# Patient Record
Sex: MALE | Race: WHITE
[De-identification: names, ages, dates, MRNs, and addresses within clinical notes are randomized per-mention and may not be internally consistent; named-entity substitution may affect disease eponyms.]

---

## 2022-05-17 ENCOUNTER — APPOINTMENT (OUTPATIENT)
Dept: ORTHOPEDIC SURGERY | Facility: CLINIC | Age: 67
End: 2022-05-17
Payer: OTHER MISCELLANEOUS

## 2022-05-17 VITALS — BODY MASS INDEX: 30.53 KG/M2 | WEIGHT: 190 LBS | HEIGHT: 66 IN

## 2022-05-17 DIAGNOSIS — Z78.9 OTHER SPECIFIED HEALTH STATUS: ICD-10-CM

## 2022-05-17 PROBLEM — Z00.00 ENCOUNTER FOR PREVENTIVE HEALTH EXAMINATION: Status: ACTIVE | Noted: 2022-05-17

## 2022-05-17 PROCEDURE — 99213 OFFICE O/P EST LOW 20 MIN: CPT

## 2022-05-17 PROCEDURE — 99072 ADDL SUPL MATRL&STAF TM PHE: CPT

## 2022-05-17 NOTE — WORK
[Partial] : partial [Reveals pre-existing condition(s) that may affect treatment/prognosis] : reveals pre-existing condition(s) that may affect treatment/prognosis [Can return to work without limitations on ______] : can return to work without limitations on [unfilled] [N/A] : : Not Applicable [Patient] : patient [No] : No [No Rx restrictions] : No Rx restrictions. [I provided the services listed above] :  I provided the services listed above. [FreeTextEntry1] : guarded [FreeTextEntry2] : OA knee, OA thumb [Medium Work:] : Medium Work: Exerting 20 to 50 pounds of force occasionally, and/or 10 to 25 pounds of force frequently, and/or greater than negligible up to 10 pounds of force constantly to move objects. Physical demand requirements are in excess of those for Light Work

## 2022-05-17 NOTE — PHYSICAL EXAM
[1st] : 1st [CMC Joint] : CMC joint [Left] : left knee [5___] : hamstring 5[unfilled]/5 [Equivocal] : equivocal [place] [] : no effusion [TWNoteComboBox7] : flexion 120 degrees [de-identified] : extension 0 degrees

## 2022-05-17 NOTE — HISTORY OF PRESENT ILLNESS
[5] : 5 [Dull/Aching] : dull/aching [Intermittent] : intermittent [Nothing helps with pain getting better] : Nothing helps with pain getting better [Walking] : walking [Stairs] : stairs [de-identified] : 05/17/2022: \par 2/15/22- Slight improvement only with viscoinjection series\par 1/18/22- For Orthovisc #4 left knee\par 1/11/22- For Orthovisc #3 left knee\par 1/5/22- For Orthovisc #2 left knee\par 12/28/21- For Orthovisc #1 left knee\par 7/27/21- Still having left knee issues, wants to repeat injections\par 10/29/20- Still having sharp pains left thumb when he exerts with it, left knee sore/stiff\par History of Present Illness\par 7/21/20- Had mild positive response to viscoinjections, left thumb still sore when forced\par 5/8/20- Left thumb, right shoulder, right knee improving, left knee still quite painful piero with stair climbing\par 4/28/20- f/u left hand, right shoulder, knees, overall slowly improving, left thumb still sore when hyperextended, right\par shoulder sore with certain motions, knees sore on stairs\par 3/11/20- Had a trip and fall down last step at work last week, smashed knees to ground, left hand, left side of face and\par right shoulder into concrete wall and railing. Had mouth stitched up in the hospital ER. Has soreness right shoulder with\par certain motions, soreness left hand with use of thumb, and both knees feel tight [] : no [FreeTextEntry1] : Left knee [FreeTextEntry5] : Had a trip and fall down last step at work last week, smashed knees to ground, left hand, left side of face and\par right shoulder into concrete wall and railing. Had mouth stitched up in the hospital ER.

## 2022-07-18 ENCOUNTER — APPOINTMENT (OUTPATIENT)
Dept: ORTHOPEDIC SURGERY | Facility: CLINIC | Age: 67
End: 2022-07-18

## 2022-08-16 ENCOUNTER — APPOINTMENT (OUTPATIENT)
Dept: ORTHOPEDIC SURGERY | Facility: CLINIC | Age: 67
End: 2022-08-16

## 2022-08-16 VITALS — WEIGHT: 190 LBS | HEIGHT: 66 IN | BODY MASS INDEX: 30.53 KG/M2

## 2022-08-16 DIAGNOSIS — S63.682D: ICD-10-CM

## 2022-08-16 DIAGNOSIS — M17.12 UNILATERAL PRIMARY OSTEOARTHRITIS, LEFT KNEE: ICD-10-CM

## 2022-08-16 DIAGNOSIS — M18.12 UNILATERAL PRIMARY OSTEOARTHRITIS OF FIRST CARPOMETACARPAL JOINT, LEFT HAND: ICD-10-CM

## 2022-08-16 PROCEDURE — 99214 OFFICE O/P EST MOD 30 MIN: CPT

## 2022-08-16 PROCEDURE — 99072 ADDL SUPL MATRL&STAF TM PHE: CPT

## 2022-08-16 NOTE — HISTORY OF PRESENT ILLNESS
[Dull/Aching] : dull/aching [Tingling] : tingling [5] : 5 [Intermittent] : intermittent [Nothing helps with pain getting better] : Nothing helps with pain getting better [Walking] : walking [Stairs] : stairs [de-identified] : 05/17/2022: \par 2/15/22- Slight improvement only with viscoinjection series\par 1/18/22- For Orthovisc #4 left knee\par 1/11/22- For Orthovisc #3 left knee\par 1/5/22- For Orthovisc #2 left knee\par 12/28/21- For Orthovisc #1 left knee\par 7/27/21- Still having left knee issues, wants to repeat injections\par 10/29/20- Still having sharp pains left thumb when he exerts with it, left knee sore/stiff\par History of Present Illness\par 7/21/20- Had mild positive response to viscoinjections, left thumb still sore when forced\par 5/8/20- Left thumb, right shoulder, right knee improving, left knee still quite painful piero with stair climbing\par 4/28/20- f/u left hand, right shoulder, knees, overall slowly improving, left thumb still sore when hyperextended, right\par shoulder sore with certain motions, knees sore on stairs\par 3/11/20- Had a trip and fall down last step at work last week, smashed knees to ground, left hand, left side of face and\par right shoulder into concrete wall and railing. Had mouth stitched up in the hospital ER. Has soreness right shoulder with\par certain motions, soreness left hand with use of thumb, and both knees feel tight [] : no [FreeTextEntry1] : Left knee [FreeTextEntry5] : Had a trip and fall down last step at work last week, smashed knees to ground, left hand, left side of face and\par right shoulder into concrete wall and railing. Had mouth stitched up in the hospital ER.

## 2022-08-16 NOTE — WORK
[Partial] : partial [Reveals pre-existing condition(s) that may affect treatment/prognosis] : reveals pre-existing condition(s) that may affect treatment/prognosis [Can return to work without limitations on ______] : can return to work without limitations on [unfilled] [N/A] : : Not Applicable [Patient] : patient [No] : No [No Rx restrictions] : No Rx restrictions. [I provided the services listed above] :  I provided the services listed above. [Medium Work:] : Medium Work: Exerting 20 to 50 pounds of force occasionally, and/or 10 to 25 pounds of force frequently, and/or greater than negligible up to 10 pounds of force constantly to move objects. Physical demand requirements are in excess of those for Light Work [Has the patient reached Maximum Medical Improvement? If yes, indicate date___] : Yes, on [unfilled] [Is there permanent partial impairment?] : Yes [Left] : left [Yes] : Yes [___lbs] : Occasionally: [unfilled] lbs [] : Occasionally [Light Work] : Light work [Could this patient perform his/her at-injury work activities with restrictions? If yes, specify below.] : Yes [Could this patient perform any work activities with or without restrictions? Explain below.] : Yes [FreeTextEntry1] : guarded [FreeTextEntry2] : OA knee, OA thumb [FreeTextEntry6] : left knee\par left thumb [FreeTextEntry7] : knee [FreeTextEntry8] : 0-120 [de-identified] : 0-130 [FreeTextEntry5] : 10% loss of left knee [de-identified] : Left knee injury exacerbating underlying OA, mild deficits in flexion=10% loss of use [de-identified] : thumb [de-identified] : CMC flexion to 3rd MCP [de-identified] : CMC flexion to 4th MCP [de-identified] : 20% loss of thumb [de-identified] : Thumb injury with fracture intraarticular at CMC exacerbating underlying OA=20% loss of use [Has the patient had an injury/illness since the date of injury which impacts residual functional capacity?] : No [Would the patient benefit from vocational rehabilitation? If Yes, explain below.] :  No

## 2022-08-29 ENCOUNTER — FORM ENCOUNTER (OUTPATIENT)
Age: 67
End: 2022-08-29

## 2022-10-12 ENCOUNTER — APPOINTMENT (OUTPATIENT)
Dept: ORTHOPEDIC SURGERY | Facility: CLINIC | Age: 67
End: 2022-10-12

## 2022-10-12 VITALS — HEIGHT: 66 IN | BODY MASS INDEX: 30.53 KG/M2 | WEIGHT: 190 LBS

## 2022-10-12 DIAGNOSIS — G57.92 UNSPECIFIED MONONEUROPATHY OF LEFT LOWER LIMB: ICD-10-CM

## 2022-10-12 PROCEDURE — 99072 ADDL SUPL MATRL&STAF TM PHE: CPT

## 2022-10-12 PROCEDURE — 99214 OFFICE O/P EST MOD 30 MIN: CPT

## 2022-10-12 NOTE — HISTORY OF PRESENT ILLNESS
[4] : 4 [0] : 0 [Burning] : burning [Full time] : Work status: full time [de-identified] : WC DOI: 9/21/21 \par \par 9/22/21: inversion injury at work yesterday w/ ankle pain. no tx to date. no prior ankle probs. no dm/tob. electric\par amanda at [employer]. working since injury\par 10/6/21: improving. walking in brace. not working\par 10/27/21: improving. walking in brace. going to PT. not working\par 11/10/21: improving. walking in brace. going to PT. not working\par 11/17/21: improving. walking in brace. going to PT. not working. MRI f/up\par 12/16/21: improving. walking in brace. going to PT. working full duty\par 2/17/22: improving. has not been going to PT. had covid since last visit. wearing brace. working full duty\par \par 10/12/2022: c/o burning in ankle at times. wearing brace at times. working full duty.  [] : Post Surgical Visit: no [FreeTextEntry1] : LT ankle

## 2022-10-12 NOTE — ASSESSMENT
[FreeTextEntry1] : wbat\par emg to eval neuritis symptoms\par working full time\par nsaids prn\par f/up after EMG

## 2023-07-05 ENCOUNTER — APPOINTMENT (OUTPATIENT)
Dept: ORTHOPEDIC SURGERY | Facility: CLINIC | Age: 68
End: 2023-07-05
Payer: OTHER MISCELLANEOUS

## 2023-07-05 VITALS — HEIGHT: 66 IN | WEIGHT: 190 LBS | BODY MASS INDEX: 30.53 KG/M2

## 2023-07-05 DIAGNOSIS — S93.492D SPRAIN OF OTHER LIGAMENT OF LEFT ANKLE, SUBSEQUENT ENCOUNTER: ICD-10-CM

## 2023-07-05 PROCEDURE — 99214 OFFICE O/P EST MOD 30 MIN: CPT

## 2023-07-05 NOTE — HISTORY OF PRESENT ILLNESS
[4] : 4 [0] : 0 [Burning] : burning [Full time] : Work status: full time [de-identified] :  DOI: 9/21/21 \par \par 9/22/21: inversion injury at work yesterday w/ ankle pain. no tx to date. no prior ankle probs. no dm/tob. electric\par amanda at [employer]. working since injury\par 10/6/21: improving. walking in brace. not working\par 10/27/21: improving. walking in brace. going to PT. not working\par 11/10/21: improving. walking in brace. going to PT. not working\par 11/17/21: improving. walking in brace. going to PT. not working. MRI f/up\par 12/16/21: improving. walking in brace. going to PT. working full duty\par 2/17/22: improving. has not been going to PT. had covid since last visit. wearing brace. working full duty\par \par 10/12/2022: c/o burning in ankle at times. wearing brace at times. working full duty. \par \par 07/05/2023: not seen in 9 months. aching/pulling at times. no new injury. did not have EMG. working full duty [] : Post Surgical Visit: no [FreeTextEntry1] : LT ankle

## 2023-07-05 NOTE — ASSESSMENT
[FreeTextEntry1] : wbat\par rec return to PT\par ice/elevate\par nsaids prn\par working full duty\par f/up 3 months

## 2023-07-05 NOTE — PHYSICAL EXAM
[Left] : left foot and ankle [NL (40)] : plantar flexion 40 degrees [NL 30)] : inversion 30 degrees [NL (20)] : eversion 20 degrees [5___] : eversion 5[unfilled]/5 [2+] : dorsalis pedis pulse: 2+ [] : patient ambulates without assistive device [FreeTextEntry8] : mild dorsolateral foot ttp [TWNoteComboBox7] : dorsiflexion 15 degrees

## 2023-07-13 NOTE — PHYSICAL EXAM
Call was not transferred as requested through the eco chat.    Another message was left for Chetan to call back via the [place]  [place].    [1st] : 1st [CMC Joint] : CMC joint [Left] : left knee [5___] : hamstring 5[unfilled]/5 [Equivocal] : equivocal [place] [Right] : right knee [FreeTextEntry9] : CMC flexion left thumb to 3rd MCP [] : no effusion [TWNoteComboBox7] : flexion 130 degrees [de-identified] : extension 0 degrees

## 2023-10-04 ENCOUNTER — APPOINTMENT (OUTPATIENT)
Dept: ORTHOPEDIC SURGERY | Facility: CLINIC | Age: 68
End: 2023-10-04